# Patient Record
Sex: FEMALE | Race: WHITE | Employment: OTHER | URBAN - METROPOLITAN AREA
[De-identification: names, ages, dates, MRNs, and addresses within clinical notes are randomized per-mention and may not be internally consistent; named-entity substitution may affect disease eponyms.]

---

## 2024-01-12 ENCOUNTER — OFFICE VISIT (OUTPATIENT)
Dept: PULMONOLOGY | Age: 49
End: 2024-01-12

## 2024-01-12 VITALS
HEIGHT: 66 IN | BODY MASS INDEX: 23.69 KG/M2 | SYSTOLIC BLOOD PRESSURE: 100 MMHG | HEART RATE: 67 BPM | RESPIRATION RATE: 17 BRPM | OXYGEN SATURATION: 98 % | WEIGHT: 147.4 LBS | DIASTOLIC BLOOD PRESSURE: 60 MMHG

## 2024-01-12 DIAGNOSIS — G47.8 NON-RESTORATIVE SLEEP: ICD-10-CM

## 2024-01-12 DIAGNOSIS — G25.81 RLS (RESTLESS LEGS SYNDROME): ICD-10-CM

## 2024-01-12 DIAGNOSIS — R06.83 SNORING: Primary | ICD-10-CM

## 2024-01-12 DIAGNOSIS — G47.19 EXCESSIVE DAYTIME SLEEPINESS: ICD-10-CM

## 2024-01-12 ASSESSMENT — SLEEP AND FATIGUE QUESTIONNAIRES
HOW LIKELY ARE YOU TO NOD OFF OR FALL ASLEEP WHILE SITTING AND READING: 0
ESS TOTAL SCORE: 5
HOW LIKELY ARE YOU TO NOD OFF OR FALL ASLEEP WHEN YOU ARE A PASSENGER IN A CAR FOR AN HOUR WITHOUT A BREAK: 2
HOW LIKELY ARE YOU TO NOD OFF OR FALL ASLEEP WHILE WATCHING TV: 0
HOW LIKELY ARE YOU TO NOD OFF OR FALL ASLEEP WHILE LYING DOWN TO REST IN THE AFTERNOON WHEN CIRCUMSTANCES PERMIT: 3
HOW LIKELY ARE YOU TO NOD OFF OR FALL ASLEEP WHILE SITTING INACTIVE IN A PUBLIC PLACE: 0
HOW LIKELY ARE YOU TO NOD OFF OR FALL ASLEEP IN A CAR, WHILE STOPPED FOR A FEW MINUTES IN TRAFFIC: 0
HOW LIKELY ARE YOU TO NOD OFF OR FALL ASLEEP WHILE SITTING QUIETLY AFTER LUNCH WITHOUT ALCOHOL: 0
NECK CIRCUMFERENCE (INCHES): 12.5
HOW LIKELY ARE YOU TO NOD OFF OR FALL ASLEEP WHILE SITTING AND TALKING TO SOMEONE: 0

## 2024-01-12 NOTE — PATIENT INSTRUCTIONS
What's next:  Schedule a study with the sleep lab (call them at [phone] if you do not receive their call.)    Read through the sleep hygiene tips below to see what kind of changes you can start working on in the meantime, while awaiting your sleep study.    We will meet after your sleep study to discuss results, options and recommendations from there.       It was great to meet you and take care Fabi Bains    ______________________________________________________________________  Never drive a car or operate a motorized vehicle while drowsy or sleepy.   ______________________________________________________________________        Sleep Hygiene... Important practices for better sleep:    Avoid naps. This will ensure you are sleepy at bedtime.  If you have to take a nap, sleep less than 1 hour, before 3 pm.  SCHEDULE: Have a fixed bedtime and awakening time. The human body thrives on routines. Only deviate from these set sleep times about 1-2 hours on the weekends (more than this will start altering your internal clock). You will feel better keeping a regular sleep cycle and giving your body a dependable pattern, even (especially) if you are retired or not working.   Use light to train your biological clock: When you get up in the morning, exposure yourself to bright lights. When preparing for bed, dim the lights and avoid exposure to screens.  The blue light from electronic screens tells our brain that it is time to be awake; it inhibits melatonin production which stops our brain from helping us get to sleep.   Go to bed only when sleepy; this reduces the time you are awake in bed (which can lead to frustration and negative thoughts about sleep). If you can't fall asleep within 15-30 minutes, get up and do something boring until you feel sleepy again. Sit quietly in the dark or read the warranty on your refrigerator. Don't expose yourself to bright light during this time (especially screens), which

## 2024-01-12 NOTE — PROGRESS NOTES
PULMONARY, CRITICAL CARE AND SLEEP MEDICINE   Outpatient Sleep Consult Note  CC: Snoring  Referring Provider: Patient is being seen at the request of Dr. Thomas Damon for a consultation to evaluate for Obstructive Sleep Apnea.    Presenting HPI 1/12/24:  47 yo female with about a 5 year history of non-restorative sleep that has been gradually worsening, associated with moderate snoring that interferes with her spouse's sleep nightly, better these last few nights after 8 lb weight loss. No obvious observed apneas but + choking or gasping during sleep, + dry mouth waking up.  No treatments tried so far & has not been evaluated for sleep apnea in the past.  Routine is relatively consistent. Gets ~ 8-9 hrs of sleep per night on average, sometimes even up to 10 but this does not help with the daytime sleepiness/fatigue. Does have 1-3 awakenings per night and takes 15-30 mins to get back to sleep.  To restroom infrequently x/night.  Takes frequent naps during the day 20-40 mins each about 3-4x per week.  [place] is [place].  No car wrecks/near wrecks because of the sleepiness or nodding off while driving.  Weight: overall has gained 15 lbs over 6-8 months.  Drinks 1-2 caffinated beverages/day.  Never smoker.  + strong family history of FREYA in her father on CPAP and brothers (full siblings).  + RLS, recent development, not interfering with sleep currently but occurs nightly.      reports that she has never smoked. She has never used smokeless tobacco.    Past Medical History:   Diagnosis Date    Anxiety     GERD (gastroesophageal reflux disease)     Migraine headache     Nausea & vomiting      Past Surgical History:   Procedure Laterality Date    BREAST SURGERY      augmentation    HYSTEROSCOPY N/A 6/5/14    D&C,endometrial ablation     No Known Allergies  Medication list was reviewed and updated as needed in Epic.    family history includes Other in her mother.    Review of Systems: Complete Review of system reviewed

## 2024-01-31 ENCOUNTER — HOSPITAL ENCOUNTER (OUTPATIENT)
Dept: SLEEP CENTER | Age: 49
Discharge: HOME | End: 2024-02-02
Payer: COMMERCIAL

## 2024-01-31 DIAGNOSIS — G47.19 EXCESSIVE DAYTIME SLEEPINESS: ICD-10-CM

## 2024-01-31 DIAGNOSIS — R06.83 SNORING: ICD-10-CM

## 2024-01-31 DIAGNOSIS — G47.8 NON-RESTORATIVE SLEEP: ICD-10-CM

## 2024-01-31 PROCEDURE — 95810 POLYSOM 6/> YRS 4/> PARAM: CPT

## 2024-02-01 PROBLEM — G47.19 EXCESSIVE DAYTIME SLEEPINESS: Status: ACTIVE | Noted: 2024-02-01

## 2024-02-01 PROBLEM — G47.8 NON-RESTORATIVE SLEEP: Status: ACTIVE | Noted: 2024-02-01

## 2024-02-01 PROBLEM — R06.83 SNORING: Status: ACTIVE | Noted: 2024-02-01

## 2024-02-14 ENCOUNTER — TELEMEDICINE (OUTPATIENT)
Dept: PULMONOLOGY | Age: 49
End: 2024-02-14
Payer: COMMERCIAL

## 2024-02-14 DIAGNOSIS — G47.8 NON-RESTORATIVE SLEEP: Primary | ICD-10-CM

## 2024-02-14 DIAGNOSIS — G47.61 PERIODIC LIMB MOVEMENT DISORDER (PLMD): ICD-10-CM

## 2024-02-14 DIAGNOSIS — G47.33 MILD OBSTRUCTIVE SLEEP APNEA: ICD-10-CM

## 2024-02-14 PROBLEM — R06.83 SNORING: Status: RESOLVED | Noted: 2024-02-01 | Resolved: 2024-02-14

## 2024-02-14 PROBLEM — G47.19 EXCESSIVE DAYTIME SLEEPINESS: Status: RESOLVED | Noted: 2024-02-01 | Resolved: 2024-02-14

## 2024-02-14 PROCEDURE — 99214 OFFICE O/P EST MOD 30 MIN: CPT

## 2024-02-15 NOTE — PATIENT INSTRUCTIONS
[phone] [address] [place] [address]  **[facility]**   [facility] [phone]  [phone] [address] [place] [address]   [facility] [phone] or  [phone] [address] [place] [address]  **[facility]**   [facility] [phone] [address] [place] [address]   [employer] [facility] [phone] opt4 opt2 [phone] Fax Order to them and they will forward to DME   [facility] [phone] [address] [place] [address]  **[place] [address]/[facility] [phone] or  [phone] [address] [place] [address]  **Near [place]   [facility] [employer]  Supplies, not DME  No insurance, Cash ONLY [phone]  [facility] [phone]    [employer] (online) [phone] Online   [facility] [phone]  [phone] [address] [place] [address]   [facility]  Oxygen/PAP supplies only [phone] [address] [place] [address]  **[facility]  Oxygen/[phone] [address] [place] [address]   [facility]  Oxygen/PAP/-[phone] [address] [place] [address]   [facility]  Oxygen/[phone]   [place] [address]   [facility]  Oxygen/[phone] [address] [place] [address]   [facility]  Oxygen/[phone] [address]   [place] [address]   [facility]  ([employer])  [phone] [address] [address]   [facility]  ([facility]) [phone] [facility] [place] [address]   [facility]  [phone] [address] [place] [address]

## 2024-02-16 NOTE — PROGRESS NOTES
Printed and mailed AVS 2/15/24.  
guardian if applicable) is aware that this is a billable service, which includes applicable co-pays. This Virtual Visit was conducted with patient's (and/or legal guardian's) consent. Patient identification was verified, and a caregiver was present when appropriate. The patient was located in a state where the provider was licensed to provide care. Provider was located at facility: [address] [place] [address], [address], [place] [address].   --Allie Veloz PA-C on 2/14/2024 at 6:59 PM  An electronic signature was used to authenticate this note.